# Patient Record
(demographics unavailable — no encounter records)

---

## 2025-04-24 NOTE — BIRTH HISTORY
[Duration: ___ wks] : duration: [unfilled] weeks [] :  [___ lbs.] : [unfilled] lbs [___ oz.] : [unfilled] oz. [Was child in NICU?] : Child was in NICU [FreeTextEntry7] : 1 day observation

## 2025-04-24 NOTE — REVIEW OF SYSTEMS
[Appropriate Age Development] : development appropriate for age [Change in Activity] : no change in activity [Fever Above 102] : no fever [Rash] : no rash [Heart Problems] : no heart problems [Congestion] : no congestion [Feeding Problem] : no feeding problem [Limping] : no limping [Joint Pains] : no arthralgias [Sleep Disturbances] : ~T no sleep disturbances

## 2025-04-24 NOTE — PHYSICAL EXAM
[FreeTextEntry1] : GAIT: No limp. Good coordination and balance noted. GENERAL: alert, cooperative pleasant young 6 yo male in NAD SKIN: The skin is intact, warm, pink and dry over the area examined. EYES: Normal conjunctiva, normal eyelids and pupils were equal and round. ENT: normal ears, mask obscures exam CARDIOVASCULAR: brisk capillary refill, but no peripheral edema. RESPIRATORY: The patient is in no apparent respiratory distress. They're taking full deep breaths without use of accessory muscles or evidence of audible wheezes or stridor without the use of a stethoscope. Normal respiratory effort. ABDOMEN: not examined SPINE no evidence of asymmetry UPPER extremity: full symmetrical ROM all joints. No instability to stress. No tenderness to palpation. No masses palpable left axilla/elbow/forearm. on the left there is concern the forearm is shorter at the DRUJ relationship of  the ulna on the left noted but no deformity noted.  Motor strength 5/5, good  strength, sensation grossly intact, brisk cap refill LOWER extremity: Neutral alignment of the lower extremities. No LLD noted.  Hips full flexion and extension. Wide symmetrical abduction. Neg galleazzi. Symmetrical IR and ER. +mass noted medial distal femur and tibia right with prominence of the right fibular head. left medial femur.  Knee: full flexion and extension. No effusion. No tenderness to palpation. No instability to stress PA: 10 degrees Mild ITT noted right and neutral Left.  Ankle/foot: arch present at rest. No callouses on the feet. DF 40-50 with knee flexed bilaterally. +mass noted right medial tibia and left medial and lateral mass noted. No tenderness.  The left fibula appears shorter than the tibia. Motor strength 5/5, sensation grossly intact, brisk cap refill Reflexes symmetrical . Neg babinski, neg clonus

## 2025-04-24 NOTE — REASON FOR VISIT
[Follow Up] : a follow up visit [Mother] : mother [Family Member] : family member [FreeTextEntry1] : osteochondromas/MHE

## 2025-04-24 NOTE — HISTORY OF PRESENT ILLNESS
[0] : currently ~his/her~ pain is 0 out of 10 [FreeTextEntry1] : 6 yo male presents with grandmother in the office and mother  for f/u of MHE. He was seen last 1 year ago.  Mother states he is doing well. There was some concern about his growth. Mother states the pediatrician states he dropped off the growth chart.   He is active without difficulty. They have not noted any change in alignment.

## 2025-04-24 NOTE — ASSESSMENT
[FreeTextEntry1] : MHE  The history for today's visit was obtained from the child, as well as the parent. The child's history was unreliable alone due to age and therefore, the parent was used today as an independent historian. He is doing well clinically today. It was discussed that many children with MHE are of short stature. It was discussed that the osteochondromas can effect growth, causing alignment issues and that is the reason for repeat clinical exams yearly to catch any angular concerns. It was discussed that the masses can enlarge as the child grows.  If they become symptomatic excision can be considered.  We will continue to monitor especially the left wrist and the left ankle.  He delon lf/u with us in 6 months for repeat clinical exam and Xrays will be taken of the bilateral wrists and possibly of the Lower extremity depending on PE.  Activity as tolerated  All questions answered. Parent in agreement with the plan. Bisi DOW, KISHOR, PAC, have acted as a scribe and documented the above for Dr. Diaz The above documentation completed by the scribe is an accurate record of both my words and actions.  YOANNA